# Patient Record
Sex: FEMALE | Race: WHITE | NOT HISPANIC OR LATINO | Employment: FULL TIME | URBAN - METROPOLITAN AREA
[De-identification: names, ages, dates, MRNs, and addresses within clinical notes are randomized per-mention and may not be internally consistent; named-entity substitution may affect disease eponyms.]

---

## 2021-02-11 ENCOUNTER — TELEPHONE (OUTPATIENT)
Dept: FAMILY MEDICINE CLINIC | Facility: CLINIC | Age: 56
End: 2021-02-11

## 2021-02-11 NOTE — TELEPHONE ENCOUNTER
PT IS CALLING IN STATING THAT SHE WANTS TO BE SEEN AS A NEW PT AT THIS PRACTICE.  I HAVE CHECKED THE SCHEDULE AND NOTHING AT ALL IS AVAILABLE.  PT HAS [employer] PPO INSURANCE AND WANTS TO BE SEEN IN THE OFFICE AS A NEW PT.  I ATTEMPTED TO WARM TRANSFER TO ASK OFFICE IF THERE ARE ANY APPOINTMENTS BUT GOT NO ANSWER.  PT WANTS TO BE CALLED BACK REGARDING A APPOINTMENT    PT CALL BACK  [phone]

## 2021-02-11 NOTE — TELEPHONE ENCOUNTER
Left detailed message on voice mail at 1:08pm that no one in our practice is accepting new patients at this time.

## 2021-07-28 ENCOUNTER — HOSPITAL ENCOUNTER (OUTPATIENT)
Dept: GENERAL RADIOLOGY | Facility: HOSPITAL | Age: 56
Discharge: HOME | End: 2021-07-28
Admitting: NURSE PRACTITIONER

## 2021-07-28 ENCOUNTER — OFFICE VISIT (OUTPATIENT)
Dept: FAMILY MEDICINE CLINIC | Facility: CLINIC | Age: 56
End: 2021-07-28

## 2021-07-28 VITALS
HEART RATE: 88 BPM | DIASTOLIC BLOOD PRESSURE: 78 MMHG | WEIGHT: 147 LBS | SYSTOLIC BLOOD PRESSURE: 126 MMHG | OXYGEN SATURATION: 97 % | HEIGHT: 66 IN | BODY MASS INDEX: 23.63 KG/M2

## 2021-07-28 DIAGNOSIS — M54.2 NECK PAIN: Primary | ICD-10-CM

## 2021-07-28 DIAGNOSIS — M54.2 NECK PAIN: ICD-10-CM

## 2021-07-28 PROCEDURE — 72040 X-RAY EXAM NECK SPINE 2-3 VW: CPT

## 2021-07-28 PROCEDURE — 99203 OFFICE O/P NEW LOW 30 MIN: CPT | Performed by: NURSE PRACTITIONER

## 2021-07-28 NOTE — PROGRESS NOTES
"Subjective   Lexi Witt is a 55 y.o. female.       HPI   Pt. is new to our office today.   Medical/social/family hx reviewed today.   She reports she hasn't seen a PCP in over 20 years.    She has concern today of neck pain.  Symptoms started about a month ago.  She denies any injury.  She mentions she doesn't have a car and walks to the grocery store; she believes symptoms started after she carried some heavier bags.  She also mentions she does repetitive movements at work and has for years.    She reports feeling pain in her left neck and down into her shoulder; she says muscles feel tight and she can hear \"a crunch\" when she moves and the pain occurs when she nods.  She says at times the pain radiates into her left arm into her hand.  She denies any numbness or tingling.      The following portions of the patient's history were reviewed and updated as appropriate: allergies, current medications, past family history, past medical history, past social history, past surgical history and problem list.    Review of Systems   Constitutional: Negative for activity change, appetite change, chills, diaphoresis, fatigue, fever, unexpected weight gain and unexpected weight loss.   Respiratory: Negative for cough, chest tightness, shortness of breath and wheezing.    Cardiovascular: Negative for chest pain, palpitations and leg swelling.   Gastrointestinal: Negative for diarrhea, nausea and vomiting.   Musculoskeletal: Positive for myalgias and neck pain. Negative for arthralgias and back pain.   Skin: Negative for rash.   Neurological: Negative for dizziness, weakness, light-headedness, numbness, headache and confusion.   Psychiatric/Behavioral: Negative for depressed mood. The patient is not nervous/anxious.        Objective   Physical Exam  Vitals reviewed.   Constitutional:       General: She is not in acute distress.     Appearance: Normal appearance.   HENT:      Head: Normocephalic and atraumatic. "   Cardiovascular:      Rate and Rhythm: Normal rate and regular rhythm.      Pulses: Normal pulses.      Heart sounds: Normal heart sounds. No murmur heard.     Pulmonary:      Effort: Pulmonary effort is normal. No respiratory distress.      Breath sounds: Normal breath sounds. No wheezing.   Chest:      Chest wall: No tenderness.   Abdominal:      Tenderness: There is no right CVA tenderness or left CVA tenderness.   Musculoskeletal:      Cervical back: Neck supple. Tenderness and crepitus present. No swelling, edema, deformity, erythema, rigidity, torticollis or bony tenderness. Pain with movement present. Decreased range of motion.      Thoracic back: No swelling, deformity or tenderness. Normal range of motion.      Right lower leg: No edema.      Left lower leg: No edema.   Skin:     General: Skin is warm and dry.      Findings: No erythema.   Neurological:      General: No focal deficit present.      Mental Status: She is alert and oriented to person, place, and time.      Cranial Nerves: No cranial nerve deficit.      Sensory: No sensory deficit.      Motor: No weakness.   Psychiatric:         Mood and Affect: Mood normal.           Assessment/Plan   Diagnoses and all orders for this visit:    1. Neck pain (Primary)  Comments:  Xray ordered.   Can use Tylenol or ibuprofen as needed.    Heat/ice and rest.    Orders:  -     XR Spine Cervical 2 or 3 View; Future

## 2021-07-28 NOTE — PATIENT INSTRUCTIONS
Cervical Sprain  A cervical sprain is a stretch or tear in one or more of the ligaments in the neck. Ligaments are the tissues that connect bones. Cervical sprains can range from mild to severe. Severe cervical sprains can cause the spinal bones (vertebrae) in the neck to be unstable. This can result in spinal cord damage and in serious nervous system problems.  The time that it takes for a cervical sprain to heal depends on the cause and extent of the injury. Most cervical sprains heal in 4-6 weeks.  What are the causes?  Cervical sprains may be caused by trauma, such as an injury from a motor vehicle accident, a fall, or a sudden forward and backward whipping movement of the head and neck (whiplash injury). Mild cervical sprains may be caused by wear and tear over time.  What increases the risk?  The following factors may make you more likely to develop this condition:  · Participating in activities that have a high risk of trauma to the neck. These include contact sports, auto racing, gymnastics, and diving.  · Taking risks when driving or riding in a motor vehicle.  · Osteoarthritis of the spine.  · Poor strength and flexibility of the neck.  · A previous neck injury.  · Poor posture.  · Spending long periods in certain positions that put stress on the neck, such as sitting at a computer for a long time.  What are the signs or symptoms?  Symptoms of this condition include:  · Pain, soreness, stiffness, tenderness, swelling, or a burning sensation in the front, back, or sides of the neck, shoulders, or upper back.  · Sudden tightening of neck muscles (spasms).  · Limited ability to move the neck.  · Headache.  · Dizziness.  · Nausea or vomiting.  · Weakness, numbness, or tingling in a hand or an arm.  Symptoms may develop right away after injury, or they may develop over a few days. In some cases, symptoms may go away with treatment and return (recur) over time.  How is this diagnosed?  This condition may be  diagnosed based on:  · Your medical history.  · Your symptoms.  · Any recent injuries or known neck problems that you have, such as arthritis in the neck.  · A physical exam.  · Imaging tests, such as X-rays, MRI, and CT scan.  How is this treated?  This condition is treated by resting and icing the injured area and doing physical therapy exercises. Heat therapy may be used 2-3 days after the injury occurred if there is no swelling. Depending on the severity of your condition, treatment may also include:  · Keeping your neck in place (immobilized) for periods of time. This may be done using:  ? A cervical collar. This supports your chin and the back of your head.  ? A cervical traction device. This is a sling that holds up your head. The device removes weight and pressure from your neck, and it may help to relieve pain.  · Medicines that help to relieve pain and inflammation.  · Medicines that help to relax your muscles (muscle relaxants).  · Surgery. This is rare.  Follow these instructions at home:  Medicines    · Take over-the-counter and prescription medicines only as told by your health care provider.  · Ask your health care provider if the medicine prescribed to you:  ? Requires you to avoid driving or using heavy machinery.  ? Can cause constipation. You may need to take these actions to prevent or treat constipation:  § Drink enough fluid to keep your urine pale yellow.  § Take over-the-counter or prescription medicines.  § Eat foods that are high in fiber, such as beans, whole grains, and fresh fruits and vegetables.  § Limit foods that are high in fat and processed sugars, such as fried or sweet foods.  If you have a cervical collar:  · Wear the collar as told by your health care provider. Do not remove it unless told.  · Ask before making any adjustments to your collar.  · If you have long hair, keep it outside of the collar.  · Ask your health care provider if you may remove the collar for cleaning and  bathing. If so:  ? Follow instructions about how to remove it safely.  ? Clean it by hand with mild soap and water and air-dry it completely.  ? If your collar has removable pads, remove them every 1-2 days and wash them by hand with soap and water. Let them air-dry completely before putting them back in the collar.  · Tell your health care provider if your skin under the collar has irritation or sores.  Managing pain, stiffness, and swelling         · If directed, use a cervical traction device as told.  · If directed, put ice on the affected area. To do this:  ? Put ice in a plastic bag.  ? Place a towel between your skin and the bag.  ? Leave the ice on for 20 minutes, 2-3 times a day.  · If directed, apply heat to the affected area before you do your physical therapy or as often as told by your health care provider. Use the heat source that your health care provider recommends, such as a moist heat pack or a heating pad.  ? Place a towel between your skin and the heat source.  ? Leave the heat on for 20-30 minutes.  ? Remove the heat if your skin turns bright red. This is especially important if you are unable to feel pain, heat, or cold. You may have a greater risk of getting burned.  Activity  · Do not drive while wearing a cervical collar. If you do not have a cervical collar, ask if it is safe to drive while your neck heals.  · Do not lift anything that is heavier than 10 lb (4.5 kg), or the limit that you are told, until your health care provider says that it is safe.  · Rest as told by your health care provider.  · If physical therapy was prescribed, do exercises as told by your health care provider or physical therapist.  · Return to your normal activities as told by your health care provider. Avoid positions and activities that make your symptoms worse. Ask your health care provider what activities are safe for you.  General instructions  · Do not use any products that contain nicotine or tobacco, such  as cigarettes, e-cigarettes, and chewing tobacco. These can delay healing. If you need help quitting, ask your health care provider.  · Keep all follow-up visits as told by your health care provider or physical therapist. This is important.  How is this prevented?  To prevent a cervical sprain from happening again:  · Use and maintain good posture. Make any needed adjustments to your workstation to help you do this.  · Exercise regularly as told by your health care provider or physical therapist.  · Avoid risky activities that may cause a cervical sprain.  Contact a health care provider if you have:  · Symptoms that get worse or do not get better after 2 weeks of treatment.  · Pain that gets worse or does not get better with medicine.  · New, unexplained symptoms.  · Sores or irritated skin on your neck from wearing your cervical collar.  Get help right away if:  · You have severe pain.  · You develop numbness, tingling, or weakness in any part of your body.  · You cannot move a part of your body (you have paralysis).  · You have neck pain along with severe dizziness or headache.  Summary  · A cervical sprain is a stretch or tear in one or more of the ligaments in the neck.  · Cervical sprains may be caused by trauma, such as an injury from a motor vehicle accident, a fall, or a sudden forward and backward whipping movement of the head and neck (whiplash injury).  · Symptoms may develop right away after injury, or they may develop over a few days.  · This condition may be treated with rest, ice, heat, medicines, physical therapy, and surgery.  This information is not intended to replace advice given to you by your health care provider. Make sure you discuss any questions you have with your health care provider.  Document Revised: 08/26/2020 Document Reviewed: 08/26/2020  Elsevier Patient Education © 2021 [employer]

## 2021-07-29 ENCOUNTER — TELEPHONE (OUTPATIENT)
Dept: FAMILY MEDICINE CLINIC | Facility: CLINIC | Age: 56
End: 2021-07-29

## 2021-07-29 NOTE — TELEPHONE ENCOUNTER
Caller: Lexi Witt    Relationship to patient: Self    Best call back number:[phone]    Patient is needing: HUB UNABLE TO WARM TRANSFER, PATIENT IS RETURNING CALL FOR XRAY RESULTS

## 2022-02-08 ENCOUNTER — APPOINTMENT (OUTPATIENT)
Dept: GENERAL RADIOLOGY | Facility: HOSPITAL | Age: 57
End: 2022-02-08

## 2022-02-08 ENCOUNTER — HOSPITAL ENCOUNTER (EMERGENCY)
Facility: HOSPITAL | Age: 57
Discharge: HOME | End: 2022-02-08
Attending: EMERGENCY MEDICINE | Admitting: EMERGENCY MEDICINE

## 2022-02-08 VITALS
TEMPERATURE: 97 F | SYSTOLIC BLOOD PRESSURE: 110 MMHG | DIASTOLIC BLOOD PRESSURE: 75 MMHG | HEART RATE: 95 BPM | WEIGHT: 148.59 LBS | OXYGEN SATURATION: 100 % | HEIGHT: 66 IN | RESPIRATION RATE: 16 BRPM | BODY MASS INDEX: 23.88 KG/M2

## 2022-02-08 DIAGNOSIS — S63.502A SPRAIN OF LEFT WRIST, INITIAL ENCOUNTER: Primary | ICD-10-CM

## 2022-02-08 PROCEDURE — 99282 EMERGENCY DEPT VISIT SF MDM: CPT

## 2022-02-08 PROCEDURE — 73110 X-RAY EXAM OF WRIST: CPT

## 2022-02-08 RX ORDER — NAPROXEN 375 MG/1
375 TABLET ORAL 2 TIMES DAILY PRN
Qty: 14 TABLET | Refills: 0 | Status: SHIPPED | OUTPATIENT
Start: 2022-02-08

## 2022-02-08 NOTE — ED NOTES
Pt had a fall and is c/o pain from her left wrist up to her elbow.      Huong Burns, RN  02/08/22 1200

## 2022-02-08 NOTE — ED PROVIDER NOTES
Subjective   Patient is a 56-year-old female complaining of pain to her left wrist after she had fallen.  She denies other complaint of injury.          Review of Systems  Negative for numbness tingling weakness or other complaint of injury  No past medical history on file.    No Known Allergies    Past Surgical History:   Procedure Laterality Date   •  SECTION     • VEIN SURGERY      left leg       Family History   Problem Relation Age of Onset   • Cancer Mother         esophagus/lung   • Heart attack Father    • Lupus Sister    • Multiple sclerosis Sister        Social History     Socioeconomic History   • Marital status: Single   Tobacco Use   • Smoking status: Current Every Day Smoker     Packs/day: 1.00     Types: Cigarettes   • Smokeless tobacco: Never Used   Substance and Sexual Activity   • Alcohol use: Not Currently   • Drug use: Never           Objective   Physical Exam  Extremities exam shows pain to palpation about the patient's left wrist.  There is very minimal swelling.  She has 2+ pulses and is neurovascular intact.  Procedures           ED Course          XR Wrist 3+ View Left    Result Date: 2022  1. No acute osseous abnormality of the left wrist. 2. Degenerative joint space narrowing at the first carpal metacarpal and triscaphe joints.  If pain persists or snuffbox tenderness, follow-up radiograph in 7-10 days could be considered.  Electronically Signed By-Yakov Gaviria MD On:2022 12:32 PM This report was finalized on  by  Yakov Gaviria MD.                                            MDM  Number of Diagnoses or Management Options  Diagnosis management comments: Patient has findings consistent with sprained left wrist.  She will be placed on leather wrist pain.  She will be prescribed Naprosyn will follow with MD for recheck.       Amount and/or Complexity of Data Reviewed  Tests in the radiology section of CPT®: reviewed    Risk of Complications, Morbidity,  and/or Mortality  Presenting problems: moderate  Diagnostic procedures: moderate  Management options: moderate    Patient Progress  Patient progress: stable      Final diagnoses:   Sprain of left wrist, initial encounter       ED Disposition  ED Disposition     ED Disposition Condition Comment    Discharge Stable           No follow-up provider specified.       Medication List      New Prescriptions    naproxen 375 MG tablet  Commonly known as: NAPROSYN  Take 1 tablet by mouth 2 (Two) Times a Day As Needed for Moderate Pain .           Where to Get Your Medications      These medications were sent to [facility], [place] - [address] AT [facility] [address] - [phone]  - [phone] FX  [address], [place] [address]    Phone: [phone]   · naproxen 375 MG tablet          Epifanio Bahena MD  02/08/22 3073

## 2023-07-24 ENCOUNTER — APPOINTMENT (OUTPATIENT)
Dept: GENERAL RADIOLOGY | Facility: HOSPITAL | Age: 58
End: 2023-07-24
Payer: COMMERCIAL

## 2023-07-24 ENCOUNTER — HOSPITAL ENCOUNTER (EMERGENCY)
Facility: HOSPITAL | Age: 58
Discharge: HOME | End: 2023-07-24
Attending: EMERGENCY MEDICINE | Admitting: EMERGENCY MEDICINE
Payer: COMMERCIAL

## 2023-07-24 VITALS
WEIGHT: 148 LBS | DIASTOLIC BLOOD PRESSURE: 82 MMHG | SYSTOLIC BLOOD PRESSURE: 169 MMHG | OXYGEN SATURATION: 98 % | RESPIRATION RATE: 17 BRPM | BODY MASS INDEX: 23.78 KG/M2 | HEART RATE: 104 BPM | TEMPERATURE: 97.7 F | HEIGHT: 66 IN

## 2023-07-24 DIAGNOSIS — S63.501A WRIST SPRAIN, RIGHT, INITIAL ENCOUNTER: Primary | ICD-10-CM

## 2023-07-24 DIAGNOSIS — S46.211A STRAIN OF RIGHT BICEPS MUSCLE, INITIAL ENCOUNTER: ICD-10-CM

## 2023-07-24 PROCEDURE — 73060 X-RAY EXAM OF HUMERUS: CPT

## 2023-07-24 PROCEDURE — 99283 EMERGENCY DEPT VISIT LOW MDM: CPT

## 2023-07-24 PROCEDURE — 73030 X-RAY EXAM OF SHOULDER: CPT

## 2023-07-24 PROCEDURE — 73110 X-RAY EXAM OF WRIST: CPT

## 2023-07-24 RX ORDER — NAPROXEN 500 MG/1
500 TABLET ORAL 2 TIMES DAILY PRN
Qty: 14 TABLET | Refills: 0 | Status: SHIPPED | OUTPATIENT
Start: 2023-07-24

## 2023-07-24 NOTE — Clinical Note
[facility] [place] [address] [place] [address] [place] [address]  Phone: [phone]    Lexi Witt was seen and treated in our emergency department on 7/24/2023.  She may return to work on 07/27/2023.         Thank you for choosing [facility].    Daniel Scott MD

## 2023-07-25 NOTE — DISCHARGE INSTRUCTIONS
Use the arm sling for 2 days all the time.  Then you may use the wrist splint for a couple of weeks as needed.  Follow-up with the primary you may need physical therapy.

## 2023-07-25 NOTE — FSED PROVIDER NOTE
Subjective   History of Present Illness  Patient is complaining of right arm pain the patient was moving a couch and she lost her balance she fell backwards on Thursday night attempted to catch herself the right hand and wrist hurt unable to lift her arm and dress self but limited range of motion of that right arm.    Review of Systems   Musculoskeletal:  Positive for myalgias.   All other systems reviewed and are negative.    History reviewed. No pertinent past medical history.    No Known Allergies    Past Surgical History:   Procedure Laterality Date     SECTION      VEIN SURGERY      left leg       Family History   Problem Relation Age of Onset    Cancer Mother         esophagus/lung    Heart attack Father     Lupus Sister     Multiple sclerosis Sister        Social History     Socioeconomic History    Marital status: Single   Tobacco Use    Smoking status: Every Day     Packs/day: 1.00     Types: Cigarettes    Smokeless tobacco: Never   Vaping Use    Vaping Use: Never used   Substance and Sexual Activity    Alcohol use: Not Currently    Drug use: Never    Sexual activity: Defer           Objective   Physical Exam  Vitals and nursing note reviewed.   Constitutional:       General: She is not in acute distress.     Appearance: Normal appearance. She is not ill-appearing, toxic-appearing or diaphoretic.   HENT:      Head: Normocephalic and atraumatic.      Nose: No congestion or rhinorrhea.      Mouth/Throat:      Mouth: Mucous membranes are moist.      Pharynx: No oropharyngeal exudate or posterior oropharyngeal erythema.   Eyes:      Extraocular Movements: Extraocular movements intact.      Pupils: Pupils are equal, round, and reactive to light.   Musculoskeletal:      Comments: Patient has tenderness at the biceps of the long head.  He hurts mid biceps at the tenderness attachment to the muscle to palpation.  Against resistance it recreates her pain the shoulder is fine full range of motion and so is  the elbow passively.  Against resistance though that is where her pain is at.  Good radial and ulnar pulses.  Same are not and is where she hurt her wrist when she put her arm back.   Skin:     General: Skin is warm and dry.      Capillary Refill: Capillary refill takes less than 2 seconds.      Findings: Bruising present.      Comments: Ecchymosis at the wrist on the volar aspect with some swelling and radial and ulnar pulses full range of motion at the wrist.   Neurological:      General: No focal deficit present.      Mental Status: She is alert and oriented to person, place, and time.   Psychiatric:         Mood and Affect: Mood normal.         Behavior: Behavior normal.       Procedures           ED Course                                           Medical Decision Making  X-rays by the radiologist of the shoulder humerus and wrist are all negative.  Patient has tenderness along the biceps tendinous attachment to the muscle on the medial head of the biceps but there is no tear she has strength there but it hurts against active resistance.  Patient has ecchymosis to her wrist with some swelling on the volar aspect no fracture or dislocation at the risk.  The patient was placed in an arm sling and a wrist splint ice elevation off 2 days and follow-up with the primary.  Naproxen.    Amount and/or Complexity of Data Reviewed  Radiology: ordered.     Details: X-rays of the right humerus are negative for any fracture dislocation right shoulder negative for any fracture dislocation right wrist negative for any fracture dislocation read by the radiologist.        Final diagnoses:   Wrist sprain, right, initial encounter   Strain of right biceps muscle, initial encounter       ED Disposition  ED Disposition       ED Disposition   Discharge    Condition   Stable    Comment   --               Radha Avina, APRN  [address] [facility] [address]  [place] [address]  [phone]    In 3 days           Medication List         Changed      * naproxen 375 MG tablet  Commonly known as: NAPROSYN  Take 1 tablet by mouth 2 (Two) Times a Day As Needed for Moderate Pain .  What changed: Another medication with the same name was added. Make sure you understand how and when to take each.     * naproxen 500 MG EC tablet  Commonly known as: EC NAPROSYN  Take 1 tablet by mouth 2 (Two) Times a Day As Needed for Mild Pain.  What changed: You were already taking a medication with the same name, and this prescription was added. Make sure you understand how and when to take each.           * This list has 2 medication(s) that are the same as other medications prescribed for you. Read the directions carefully, and ask your doctor or other care provider to review them with you.                   Where to Get Your Medications        These medications were sent to [id number] - [place], [place] - [address] AT [facility] [address] - [phone]  - [phone] FX  [address] [place] [address]      Phone: [phone]   naproxen 500 MG EC tablet

## 2023-08-11 ENCOUNTER — OFFICE VISIT (OUTPATIENT)
Dept: FAMILY MEDICINE CLINIC | Facility: CLINIC | Age: 58
End: 2023-08-11
Payer: COMMERCIAL

## 2023-08-11 VITALS
BODY MASS INDEX: 24.27 KG/M2 | HEART RATE: 87 BPM | DIASTOLIC BLOOD PRESSURE: 79 MMHG | OXYGEN SATURATION: 96 % | WEIGHT: 151 LBS | HEIGHT: 66 IN | SYSTOLIC BLOOD PRESSURE: 123 MMHG
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
## 2023-08-11 DIAGNOSIS — M25.531 RIGHT WRIST PAIN: Primary | ICD-10-CM

## 2023-08-11 DIAGNOSIS — M79.601 RIGHT ARM PAIN: ICD-10-CM

## 2023-08-11 NOTE — PROGRESS NOTES
Subjective   Lexi Witt is a 57 y.o. female.       HPI   Pt is here today with concern of right arm/wrist pain.  Had a fall around July 23 rd at home; caught self with right arm.  Felt immediate pain.  Went to ER on 7/24; xrays were done of right wrist, arm and shoulder.  No fractures seen.  She was given a wrist splint and naproxen.  Took naproxen for a few days but it caused upset stomach so she switched to Motrin.  She has seen improvement in the bruising but still reports pain in the wrist and over the bicep when she raises her arm.  She has still seen some swelling in the wrist.      The following portions of the patient's history were reviewed and updated as appropriate: allergies, current medications, past family history, past medical history, past social history, past surgical history, and problem list.    Review of Systems   Constitutional:  Negative for chills, fatigue and fever.   Respiratory:  Negative for cough and shortness of breath.    Cardiovascular:  Negative for chest pain and palpitations.   Gastrointestinal:  Negative for diarrhea, nausea and vomiting.   Musculoskeletal:  Positive for arthralgias.   Neurological:  Negative for dizziness, weakness, numbness and headache.   Psychiatric/Behavioral:  Negative for depressed mood. The patient is not nervous/anxious.      Objective   Physical Exam  Vitals reviewed.   Constitutional:       General: She is not in acute distress.     Appearance: Normal appearance.   Cardiovascular:      Rate and Rhythm: Normal rate and regular rhythm.      Pulses: Normal pulses.   Pulmonary:      Effort: Pulmonary effort is normal. No respiratory distress.   Musculoskeletal:      Right shoulder: Tenderness present. No swelling or deformity. Decreased range of motion.      Right upper arm: Tenderness present. No swelling, edema, deformity or bony tenderness.      Right elbow: No swelling or deformity. Normal range of motion. No tenderness.      Right forearm: No  swelling, edema, deformity or tenderness.      Right wrist: Swelling (minimal) and tenderness present. No deformity, effusion or crepitus. Decreased range of motion. Normal pulse.   Skin:     General: Skin is warm and dry.      Findings: No bruising, erythema or rash.   Neurological:      General: No focal deficit present.      Mental Status: She is alert and oriented to person, place, and time.   Psychiatric:         Mood and Affect: Mood normal.         Assessment & Plan   Diagnoses and all orders for this visit:    1. Right wrist pain (Primary)  Comments:  Reviewed xrays from ER.   Referral to Ortho.   Cont. Motrin.   Heat/ice and rest.   No heavy lifting until seen by Ortho.  Orders:  -     Ambulatory Referral to Orthopedic Surgery    2. Right arm pain  Comments:  Reviewed xrays from ER.   Referral to Ortho.   Cont. Motrin.   Heat/ice and rest.   No heavy lifting until seen by Ortho.  Orders:  -     Ambulatory Referral to Orthopedic Surgery    3. Accidental fall, subsequent encounter  Comments:  Reviewed xrays from ER.   Referral to Ortho.   Cont. Motrin.   Heat/ice and rest.   No heavy lifting until seen by Ortho.  Orders:  -     Ambulatory Referral to Orthopedic Surgery

## 2023-08-11 NOTE — LETTER
August 11, 2023     Patient: Lexi Witt   YOB: 1965   Date of Visit: 8/11/2023       To Whom It May Concern:    It is my medical opinion that Lexi Witt may return to light duty immediately with the following restrictions: no lifting more than 15 pounds without assistance until cleared by Ortho.  .           Sincerely,    CHASE Chaidez

## 2023-09-07 ENCOUNTER — OFFICE VISIT (OUTPATIENT)
Dept: ORTHOPEDIC SURGERY | Facility: CLINIC | Age: 58
End: 2023-09-07
Payer: COMMERCIAL

## 2023-09-07 VITALS — WEIGHT: 151 LBS | HEIGHT: 66 IN | OXYGEN SATURATION: 94 % | BODY MASS INDEX: 24.27 KG/M2 | HEART RATE: 88 BPM

## 2023-09-07 DIAGNOSIS — M25.511 ACUTE PAIN OF RIGHT SHOULDER: Primary | ICD-10-CM

## 2023-09-07 NOTE — PROGRESS NOTES
"Lexi is a 58 y.o. year old female presents to [facility]    Chief Complaint   Patient presents with    Right Wrist - Pain, Initial Evaluation     DOI: 8/7/2023         History of Present Illness  Lexi Witt is a 58 y.o. female presents to clinic for evaluation of right shoulder ache that has been present since late July secondary to a fall where she fell backwards on a backward outstretched right hand. Provocative factors: overhead lifting and activities, abduction. Reports not able to reach her head to shampoo her hair, needing the assistance of her left arm to reach over her torso height. Reports pain at night and unable to find a position that is pain free. Past treatments: Velcro wrist splint, ibuprofen, ice/heat, stretching, HEP. Currently on light duty at work, [employer] in [place].     Current smoker 37 years.     I have reviewed the patient's medical, family, and social history in detail and updated the computerized patient record.    Objective:  Pulse 88   Ht 167.6 cm (66\")   Wt 68.5 kg (151 lb)   SpO2 94%   BMI 24.37 kg/m²      Physical Exam    Vital signs reviewed.   General: No acute distress.  Eyes: conjunctiva clear  ENT: external ears atraumatic  CV: no peripheral edema  Resp: normal respiratory effort  Skin: no rashes or wounds; normal turgor  Psych: mood and affect appropriate; recent and remote memory intact  Neuro: sensation to light touch intact    MSK Exam      Right Shoulder:  Intact skin.  No effusion.  No ecchymosis. Heart tattoo over the distal deltoid  Tenderness over the bicipital groove  Passive forward flexion 150, abduction 130, ER 55,   Active IR L2  Pain and significant weakness with O'alireza and supraspinatus testing  Negative pearce, negative scarf  Positive speed  Belly press 5/5; lift off 3/5 with pain    Imaging:  XR Humerus Right (07/24/2023 21:51)   There is no acute fracture.   There is no dislocation.   The osseous structures " are unremarkable.   The soft tissues are unremarkable.     IMPRESSION:  No acute fracture. No dislocation.    XR Shoulder 2+ View Right (07/24/2023 21:50)   No acute fractures or dislocations.  Joint spaces are well preserved.   No joint effusion.  No  soft tissue swelling.  No osseous destructive lesions.  No radiopaque foreign bodies.      IMPRESSION:  1.No acute fractures or dislocations.    Assessment:  Diagnoses and all orders for this visit:    Acute pain of right shoulder      Plan: I recommend MRI to evaluate supraspinatus rotator cuff integrity. Follow up with DR Mack for review.       Follow Up   Return for MRI f/u julianna Mack.  Patient was given instructions and counseling regarding her condition or for health maintenance advice. Please see specific information pulled into the AVS if appropriate.     EMR Dragon/Transcription disclaimer:    Much of this encounter note is an electronic transcription/translation of spoken language to printed text.  The electronic translation of spoken language may permit erroneous, or at times, nonsensical words or phrases to be inadvertently transcribed.  Although I have reviewed the note for such errors some may still exist.

## 2023-10-09 ENCOUNTER — HOSPITAL ENCOUNTER (OUTPATIENT)
Dept: MRI IMAGING | Facility: HOSPITAL | Age: 58
Discharge: HOME | End: 2023-10-09
Admitting: PHYSICIAN ASSISTANT
Payer: COMMERCIAL

## 2023-10-09 DIAGNOSIS — M25.511 ACUTE PAIN OF RIGHT SHOULDER: ICD-10-CM

## 2023-10-09 PROCEDURE — 73221 MRI JOINT UPR EXTREM W/O DYE: CPT

## 2023-10-11 ENCOUNTER — TELEPHONE (OUTPATIENT)
Dept: ORTHOPEDIC SURGERY | Facility: CLINIC | Age: 58
End: 2023-10-11

## 2023-10-11 NOTE — TELEPHONE ENCOUNTER
Caller: Pelon Witt    Relationship: Self    Best call back number: [phone]    Caller requesting test results: PELON WITT    What test was performed: MRI OF RT SHOULDER    When was the test performed: 10/9/23    Where was the test performed: [facility]    Additional notes: PATIENT READ HER RESULTS ON MYCHART AND WANTS THEM EXPLAINED TO HER. PLEASE ADVISE

## 2023-10-11 NOTE — TELEPHONE ENCOUNTER
CALL PLACED TO PATIENT, I INFORMED HER SHE WOULD NEED TO MAKE AN APPOINTMENT WITH DR RODRIGUEZ TO DISCUSS MRI RESULTS. SHE VOICED UNDERSTANDING AND WAS TRANSFERRED TO SCHEDULING TO MAKE APPOINTMENT.

## 2023-10-19 ENCOUNTER — OFFICE VISIT (OUTPATIENT)
Dept: ORTHOPEDIC SURGERY | Facility: CLINIC | Age: 58
End: 2023-10-19
Payer: COMMERCIAL

## 2023-10-19 VITALS — HEIGHT: 66 IN | BODY MASS INDEX: 25.71 KG/M2 | OXYGEN SATURATION: 98 % | WEIGHT: 160 LBS

## 2023-10-19 DIAGNOSIS — M75.121 COMPLETE TEAR OF RIGHT ROTATOR CUFF, UNSPECIFIED WHETHER TRAUMATIC: Primary | ICD-10-CM

## 2023-10-19 PROCEDURE — 99213 OFFICE O/P EST LOW 20 MIN: CPT | Performed by: ORTHOPAEDIC SURGERY

## 2023-10-19 NOTE — PROGRESS NOTES
"     Patient ID: Lexi Witt is a 58 y.o. female.    Chief Complaint:    Chief Complaint   Patient presents with    Right Shoulder - Pain, Initial Evaluation     Pain 5       HPI:  This is a 58-year-old female here with right shoulder pain since a fall about 3 months ago she initially had significant difficulty lifting her arm away from her body but over time that has improved.  It is still weak but she is able to do majority of her activities, she is still working, she has no pain at night  History reviewed. No pertinent past medical history.    Past Surgical History:   Procedure Laterality Date     SECTION      VEIN SURGERY      left leg       Family History   Problem Relation Age of Onset    Cancer Mother         esophagus/lung    Heart attack Father     Lupus Sister     Multiple sclerosis Sister           Social History     Occupational History    Not on file   Tobacco Use    Smoking status: Every Day     Packs/day: 1     Types: Cigarettes    Smokeless tobacco: Never   Vaping Use    Vaping Use: Never used   Substance and Sexual Activity    Alcohol use: Not Currently    Drug use: Never    Sexual activity: Defer      Review of Systems   Cardiovascular:  Negative for chest pain.   Musculoskeletal:  Positive for arthralgias.       Objective:    Ht 167.6 cm (66\")   Wt 72.6 kg (160 lb)   SpO2 98%   BMI 25.82 kg/m²     Physical Examination:  Right shoulder demonstrates intact skin mild supraspinatus atrophy no areas of tenderness passive elevation 170 abduction 150 external rotation 50 internal rotation L5 she has weakness but minimal pain on Speed [place] supraspinatus testing.  Belly press noted 4/5 and 3/5.  Active elevation is 150 degrees, sensory and motor exam are intact all distributions. Radial pulse is palpable and capillary refill is less than two seconds to all digits.    Imaging:  Prior x-ray and MRI reviewed demonstrate mildly reduced acromiohumeral distance on x-ray massive irreparable " supraspinatus infraspinatus tears with atrophy and mild arthritis    Assessment:  Right shoulder massive cuff tear    Plan:  Further options were discussed.  This point her symptoms appear to be quite minimal she is improving I would encourage continue conservative treatment if symptoms deteriorate such as strength or increasing pain options such as shoulder arthroplasty were also discussed      Procedures         Disclaimer: Part of this note may be an electronic transcription/translation of spoken language to printed text using the Dragon Dictation System

## 2023-12-07 ENCOUNTER — PREP FOR SURGERY (OUTPATIENT)
Dept: OTHER | Facility: HOSPITAL | Age: 58
End: 2023-12-07
Payer: COMMERCIAL

## 2023-12-07 ENCOUNTER — OFFICE VISIT (OUTPATIENT)
Dept: ORTHOPEDIC SURGERY | Facility: CLINIC | Age: 58
End: 2023-12-07
Payer: COMMERCIAL

## 2023-12-07 VITALS — HEART RATE: 97 BPM | HEIGHT: 66 IN | WEIGHT: 160 LBS | BODY MASS INDEX: 25.71 KG/M2

## 2023-12-07 DIAGNOSIS — M75.121 COMPLETE TEAR OF RIGHT ROTATOR CUFF, UNSPECIFIED WHETHER TRAUMATIC: Primary | ICD-10-CM

## 2023-12-07 PROCEDURE — 99214 OFFICE O/P EST MOD 30 MIN: CPT | Performed by: ORTHOPAEDIC SURGERY

## 2023-12-07 PROCEDURE — 97760 ORTHOTIC MGMT&TRAING 1ST ENC: CPT | Performed by: ORTHOPAEDIC SURGERY

## 2023-12-07 RX ORDER — TRANEXAMIC ACID 10 MG/ML
1000 INJECTION, SOLUTION INTRAVENOUS ONCE
OUTPATIENT
Start: 2023-12-07 | End: 2023-12-07

## 2023-12-07 RX ORDER — PREGABALIN 75 MG/1
150 CAPSULE ORAL ONCE
OUTPATIENT
Start: 2023-12-07 | End: 2023-12-07

## 2023-12-07 RX ORDER — OXYCODONE HCL 10 MG/1
10 TABLET, FILM COATED, EXTENDED RELEASE ORAL ONCE
OUTPATIENT
Start: 2023-12-07 | End: 2023-12-07

## 2023-12-07 RX ORDER — MELOXICAM 15 MG/1
15 TABLET ORAL ONCE
OUTPATIENT
Start: 2023-12-07 | End: 2023-12-07

## 2023-12-07 NOTE — PROGRESS NOTES
"     Patient ID: Lexi Witt is a 58 y.o. female.  Right shoulder pain  Returns with worsening right shoulder pain has significant difficulty at work with weakness and overhead activity    Review of Systems:        Objective:    Pulse 97   Ht 167.6 cm (66\")   Wt 72.6 kg (160 lb)   BMI 25.82 kg/m²     Physical Examination:      Right shoulder demonstrates intact skin mild supraspinatus atrophy no areas of tenderness passive elevation 170 abduction 150 external rotation 50 internal rotation L5 she has weakness but minimal pain on Speed [place] supraspinatus testing.  Belly press noted 4/5 and 3/5.  Active elevation is 130 degrees, sensory and motor exam are intact all distributions. Radial pulse is palpable and capillary refill is less than two seconds to all digits.     Imaging:       Assessment:    Right shoulder massive cuff tear with arthritis    Plan:   Further options were discussed she is ready proceed with right reverse total shoulder arthroplasty.  Postop sling dispensed  Discussed smoking cessation discussed the risks including bleeding, scar, infection, stiffness, nerve, artery, vein and tendon damage, instability, fracture, DVT, loss of life or limb. Issues of scapular notching and component revision were discussed. Questions were answered and addressed.    Greater than 15 minutes was spent demonstrating proper fit and use of the device and signs to monitor for complications       Procedures          Disclaimer: Part of this note may be an electronic transcription/translation of spoken language to printed text using the Dragon Dictation System  "

## 2023-12-22 PROBLEM — M75.121 COMPLETE TEAR OF RIGHT ROTATOR CUFF: Status: ACTIVE | Noted: 2023-12-07

## 2024-01-23 ENCOUNTER — APPOINTMENT (OUTPATIENT)
Dept: PREADMISSION TESTING | Facility: HOSPITAL | Age: 59
End: 2024-01-23
Payer: COMMERCIAL

## 2024-01-23 ENCOUNTER — LAB (OUTPATIENT)
Dept: LAB | Facility: HOSPITAL | Age: 59
End: 2024-01-23
Payer: COMMERCIAL

## 2024-01-23 ENCOUNTER — HOSPITAL ENCOUNTER (OUTPATIENT)
Dept: CARDIOLOGY | Facility: HOSPITAL | Age: 59
Discharge: HOME | End: 2024-01-23
Payer: COMMERCIAL

## 2024-01-23 ENCOUNTER — HOSPITAL ENCOUNTER (OUTPATIENT)
Dept: GENERAL RADIOLOGY | Facility: HOSPITAL | Age: 59
Discharge: HOME | End: 2024-01-23
Payer: COMMERCIAL

## 2024-01-23 DIAGNOSIS — M75.121 COMPLETE TEAR OF RIGHT ROTATOR CUFF, UNSPECIFIED WHETHER TRAUMATIC: ICD-10-CM

## 2024-01-23 LAB
ABO GROUP BLD: NORMAL
ANION GAP SERPL CALCULATED.3IONS-SCNC: 9.6 MMOL/L (ref 5–15)
APTT PPP: 31 SECONDS (ref 24–31)
BASOPHILS # BLD AUTO: 0.05 10*3/MM3 (ref 0–0.2)
BASOPHILS NFR BLD AUTO: 0.6 % (ref 0–1.5)
BLD GP AB SCN SERPL QL: NEGATIVE
BUN SERPL-MCNC: 14 MG/DL (ref 6–20)
BUN/CREAT SERPL: 19.4 (ref 7–25)
CALCIUM SPEC-SCNC: 9.5 MG/DL (ref 8.6–10.5)
CHLORIDE SERPL-SCNC: 103 MMOL/L (ref 98–107)
CO2 SERPL-SCNC: 27.4 MMOL/L (ref 22–29)
CREAT SERPL-MCNC: 0.72 MG/DL (ref 0.57–1)
DEPRECATED RDW RBC AUTO: 40 FL (ref 37–54)
EGFRCR SERPLBLD CKD-EPI 2021: 97.7 ML/MIN/1.73
EOSINOPHIL # BLD AUTO: 0.12 10*3/MM3 (ref 0–0.4)
EOSINOPHIL NFR BLD AUTO: 1.5 % (ref 0.3–6.2)
ERYTHROCYTE [DISTWIDTH] IN BLOOD BY AUTOMATED COUNT: 13 % (ref 12.3–15.4)
GLUCOSE SERPL-MCNC: 67 MG/DL (ref 65–99)
HBA1C MFR BLD: 5.5 % (ref 4.8–5.6)
HCT VFR BLD AUTO: 40.1 % (ref 34–46.6)
HGB BLD-MCNC: 13.5 G/DL (ref 12–15.9)
IMM GRANULOCYTES # BLD AUTO: 0.02 10*3/MM3 (ref 0–0.05)
IMM GRANULOCYTES NFR BLD AUTO: 0.2 % (ref 0–0.5)
INR PPP: 0.93 (ref 0.93–1.1)
LYMPHOCYTES # BLD AUTO: 2.31 10*3/MM3 (ref 0.7–3.1)
LYMPHOCYTES NFR BLD AUTO: 28.7 % (ref 19.6–45.3)
MCH RBC QN AUTO: 28.6 PG (ref 26.6–33)
MCHC RBC AUTO-ENTMCNC: 33.7 G/DL (ref 31.5–35.7)
MCV RBC AUTO: 85 FL (ref 79–97)
MONOCYTES # BLD AUTO: 0.67 10*3/MM3 (ref 0.1–0.9)
MONOCYTES NFR BLD AUTO: 8.3 % (ref 5–12)
MRSA DNA SPEC QL NAA+PROBE: NORMAL
NEUTROPHILS NFR BLD AUTO: 4.88 10*3/MM3 (ref 1.7–7)
NEUTROPHILS NFR BLD AUTO: 60.7 % (ref 42.7–76)
NRBC BLD AUTO-RTO: 0 /100 WBC (ref 0–0.2)
PLATELET # BLD AUTO: 334 10*3/MM3 (ref 140–450)
PMV BLD AUTO: 9.4 FL (ref 6–12)
POTASSIUM SERPL-SCNC: 4.6 MMOL/L (ref 3.5–5.2)
PROTHROMBIN TIME: 10.2 SECONDS (ref 9.6–11.7)
QT INTERVAL: 342 MS
QTC INTERVAL: 424 MS
RBC # BLD AUTO: 4.72 10*6/MM3 (ref 3.77–5.28)
RH BLD: POSITIVE
SODIUM SERPL-SCNC: 140 MMOL/L (ref 136–145)
T&S EXPIRATION DATE: NORMAL
WBC NRBC COR # BLD AUTO: 8.05 10*3/MM3 (ref 3.4–10.8)

## 2024-01-23 PROCEDURE — 80048 BASIC METABOLIC PNL TOTAL CA: CPT

## 2024-01-23 PROCEDURE — 86900 BLOOD TYPING SEROLOGIC ABO: CPT

## 2024-01-23 PROCEDURE — 83036 HEMOGLOBIN GLYCOSYLATED A1C: CPT

## 2024-01-23 PROCEDURE — 86901 BLOOD TYPING SEROLOGIC RH(D): CPT

## 2024-01-23 PROCEDURE — 86850 RBC ANTIBODY SCREEN: CPT

## 2024-01-23 PROCEDURE — 85025 COMPLETE CBC W/AUTO DIFF WBC: CPT

## 2024-01-23 PROCEDURE — 85730 THROMBOPLASTIN TIME PARTIAL: CPT

## 2024-01-23 PROCEDURE — 85610 PROTHROMBIN TIME: CPT

## 2024-01-23 PROCEDURE — 87641 MR-STAPH DNA AMP PROBE: CPT

## 2024-01-23 PROCEDURE — 93005 ELECTROCARDIOGRAM TRACING: CPT | Performed by: ORTHOPAEDIC SURGERY

## 2024-01-23 PROCEDURE — 71046 X-RAY EXAM CHEST 2 VIEWS: CPT

## 2024-01-23 PROCEDURE — 36415 COLL VENOUS BLD VENIPUNCTURE: CPT

## 2024-02-09 LAB
QT INTERVAL: 342 MS
QTC INTERVAL: 424 MS